# Patient Record
Sex: FEMALE | Race: BLACK OR AFRICAN AMERICAN | URBAN - METROPOLITAN AREA
[De-identification: names, ages, dates, MRNs, and addresses within clinical notes are randomized per-mention and may not be internally consistent; named-entity substitution may affect disease eponyms.]

---

## 2019-06-20 LAB — AMB DEXA, EXTERNAL: NORMAL

## 2020-06-23 LAB — PAP SMEAR, EXTERNAL: NORMAL

## 2020-07-08 LAB — MAMMOGRAPHY, EXTERNAL: NORMAL

## 2020-08-12 ENCOUNTER — TELEPHONE (OUTPATIENT)
Dept: OBGYN CLINIC | Age: 60
End: 2020-08-12

## 2020-08-12 DIAGNOSIS — B37.9 CANDIDIASIS: Primary | ICD-10-CM

## 2020-08-12 RX ORDER — FLUCONAZOLE 150 MG/1
TABLET ORAL
Qty: 2 TAB | Refills: 0 | Status: SHIPPED | OUTPATIENT
Start: 2020-08-12 | End: 2021-07-20 | Stop reason: ALTCHOICE

## 2020-08-12 NOTE — TELEPHONE ENCOUNTER
Returned patient's call and got her voicemail. Left a message that Diflucan was sent to her pharmacy per Dr Brenden Aaron.

## 2020-09-21 VITALS
BODY MASS INDEX: 24.68 KG/M2 | HEIGHT: 66 IN | SYSTOLIC BLOOD PRESSURE: 110 MMHG | DIASTOLIC BLOOD PRESSURE: 68 MMHG | WEIGHT: 153.6 LBS

## 2020-09-21 PROBLEM — M85.80 OSTEOPENIA: Status: ACTIVE | Noted: 2020-09-21

## 2020-09-21 PROBLEM — E66.9 OBESITY: Status: ACTIVE | Noted: 2020-09-21

## 2020-09-21 PROBLEM — Z78.0 MENOPAUSE: Status: ACTIVE | Noted: 2020-09-21

## 2021-06-03 ENCOUNTER — TELEPHONE (OUTPATIENT)
Dept: OBGYN CLINIC | Age: 61
End: 2021-06-03

## 2021-06-03 DIAGNOSIS — N95.1 SYMPTOMATIC MENOPAUSAL OR FEMALE CLIMACTERIC STATES: Primary | ICD-10-CM

## 2021-06-03 RX ORDER — ESTRADIOL 1 MG/1
1 TABLET ORAL DAILY
Qty: 90 TABLET | Refills: 0 | Status: SHIPPED | OUTPATIENT
Start: 2021-06-03 | End: 2021-07-20 | Stop reason: SDUPTHER

## 2021-06-03 NOTE — TELEPHONE ENCOUNTER
Returned the patient's call and advised her her request for a refill on Estradiol has been submitted to her pharmacy.

## 2021-07-14 VITALS — HEIGHT: 66 IN | BODY MASS INDEX: 24.79 KG/M2

## 2021-07-20 ENCOUNTER — OFFICE VISIT (OUTPATIENT)
Dept: OBGYN CLINIC | Age: 61
End: 2021-07-20
Payer: COMMERCIAL

## 2021-07-20 VITALS
BODY MASS INDEX: 25.09 KG/M2 | HEIGHT: 66 IN | WEIGHT: 156.13 LBS | SYSTOLIC BLOOD PRESSURE: 110 MMHG | DIASTOLIC BLOOD PRESSURE: 68 MMHG

## 2021-07-20 DIAGNOSIS — N95.1 SYMPTOMATIC MENOPAUSAL OR FEMALE CLIMACTERIC STATES: ICD-10-CM

## 2021-07-20 DIAGNOSIS — Z78.0 MENOPAUSE: ICD-10-CM

## 2021-07-20 DIAGNOSIS — Z12.72 ENCOUNTER FOR SCREENING FOR MALIGNANT NEOPLASM OF VAGINA: Primary | ICD-10-CM

## 2021-07-20 DIAGNOSIS — Z01.419 ROUTINE GYNECOLOGICAL EXAMINATION: ICD-10-CM

## 2021-07-20 PROCEDURE — 99396 PREV VISIT EST AGE 40-64: CPT | Performed by: OBSTETRICS & GYNECOLOGY

## 2021-07-20 RX ORDER — ESTRADIOL 1 MG/1
1 TABLET ORAL DAILY
Qty: 90 TABLET | Refills: 3 | Status: SHIPPED | OUTPATIENT
Start: 2021-07-20 | End: 2022-08-05 | Stop reason: SDUPTHER

## 2021-07-20 NOTE — PROGRESS NOTES
Chief Complaint   Patient presents with    Annual Exam     Mammo scheduled @ [facility], Colonoscopy-2011     Visit Vitals  /68 (BP 1 Location: Left upper arm, BP Patient Position: Sitting, BP Cuff Size: Small adult)   Ht 5' 6\" (1.676 m)   Wt 156 lb 2 oz (70.8 kg)   BMI 25.20 kg/m²

## 2021-07-20 NOTE — PROGRESS NOTES
Jt Severino is a , 62 y.o. female   No LMP recorded. Patient has had a hysterectomy. She presents for her annual    She is having no significant problems. Menstrual status:  Cycles are hysterectomy. Flow: absent. She does not have dysmenorrhea. Medical conditions:  Since her last annual GYN exam about one year ago, she has not the following changes in her health history: none. Mammogram History:    AIRAM Results (most recent):  No results found for this or any previous visit. DEXA Results (most recent):  No results found for this or any previous visit. History reviewed. No pertinent past medical history. Past Surgical History:   Procedure Laterality Date    HX GI      colonoscopy    HX HYSTERECTOMY      HX TONSIL AND ADENOIDECTOMY      HX UROLOGICAL      repair of stress incontinence by suprapubic sling       Prior to Admission medications    Medication Sig Start Date End Date Taking? Authorizing Provider   estradioL (ESTRACE) 1 mg tablet Take 1 Tablet by mouth daily. 21  Yes Morteza Powell MD       Allergies   Allergen Reactions    Clindamycin Diarrhea          Tobacco History:  reports that she has never smoked. She has never used smokeless tobacco.  Alcohol Abuse:  reports previous alcohol use. Drug Abuse:  reports no history of drug use. Family Medical/Cancer History:   Family History   Problem Relation Age of Onset    No Known Problems Mother     No Known Problems Father           Review of Systems   Constitutional: Negative for chills, fever, malaise/fatigue and weight loss. HENT: Negative for congestion, ear pain, sinus pain and tinnitus. Eyes: Negative for blurred vision and double vision. Respiratory: Negative for cough, shortness of breath and wheezing. Cardiovascular: Negative for chest pain and palpitations.    Gastrointestinal: Negative for abdominal pain, blood in stool, constipation, diarrhea, heartburn, nausea and vomiting. Genitourinary: Negative for dysuria, flank pain, frequency, hematuria and urgency. Musculoskeletal: Negative for joint pain and myalgias. Skin: Negative for itching and rash. Neurological: Negative for dizziness, weakness and headaches. Psychiatric/Behavioral: Negative for depression, memory loss and suicidal ideas. The patient is not nervous/anxious and does not have insomnia. Physical Exam  Constitutional:       Appearance: Normal appearance. HENT:      Head: Normocephalic and atraumatic. Cardiovascular:      Rate and Rhythm: Normal rate. Heart sounds: Normal heart sounds. Pulmonary:      Effort: Pulmonary effort is normal.      Breath sounds: Normal breath sounds. Chest:      Breasts:         Right: Normal.         Left: Normal.   Abdominal:      General: Abdomen is flat. Palpations: Abdomen is soft. Genitourinary:     General: Normal vulva. Vagina: Normal.      Uterus: Absent. Adnexa: Right adnexa normal and left adnexa normal.      Rectum: Normal.      Comments: PAP Obtained  Neurological:      Mental Status: She is alert. Psychiatric:         Mood and Affect: Mood normal.         Behavior: Behavior normal.         Thought Content: Thought content normal.          Visit Vitals  /68 (BP 1 Location: Left upper arm, BP Patient Position: Sitting, BP Cuff Size: Small adult)   Ht 5' 6\" (1.676 m)   Wt 156 lb 2 oz (70.8 kg)   BMI 25.20 kg/m²         Assessment:   Diagnoses and all orders for this visit:    1. Encounter for screening for malignant neoplasm of vagina  -     PAP IG, RFX APTIMA HPV ASCUS ([id number])    2. Routine gynecological examination  -     PAP IG, RFX APTIMA HPV ASCUS ([id number])    3. Menopause    4. Symptomatic menopausal or female climacteric states  -     estradioL (ESTRACE) 1 mg tablet; Take 1 Tablet by mouth daily.         Plan: Questions addressed  Counseled re: diet, exercise, healthy lifestyle  Return for Annual  Rec annual mammogram        Follow-up and Dispositions    · Return for 1 yr annual, 1 yr mammo.

## 2021-07-26 LAB
CYTOLOGIST CVX/VAG CYTO: ABNORMAL
CYTOLOGY CVX/VAG DOC CYTO: ABNORMAL
CYTOLOGY CVX/VAG DOC THIN PREP: ABNORMAL
DX ICD CODE: ABNORMAL
DX ICD CODE: ABNORMAL
LABCORP, 190119: ABNORMAL
Lab: ABNORMAL
OTHER STN SPEC: ABNORMAL
PATHOLOGIST CVX/VAG CYTO: ABNORMAL
STAT OF ADQ CVX/VAG CYTO-IMP: ABNORMAL

## 2021-08-03 RX ORDER — ESTRADIOL 10 UG/1
10 INSERT VAGINAL
Qty: 8 TABLET | Refills: 5 | Status: SHIPPED | OUTPATIENT
Start: 2021-08-05 | End: 2022-01-18

## 2021-12-29 ENCOUNTER — TELEPHONE (OUTPATIENT)
Dept: OBGYN CLINIC | Age: 61
End: 2021-12-29

## 2021-12-29 NOTE — TELEPHONE ENCOUNTER
Left a message on the patient's voicemail to call me back to get her follow up appt scheduled with Dr Ramon.

## 2022-01-18 RX ORDER — ESTRADIOL 10 UG/1
TABLET VAGINAL
Qty: 24 TABLET | Refills: 1 | Status: SHIPPED | OUTPATIENT
Start: 2022-01-18 | End: 2022-08-05 | Stop reason: SDUPTHER

## 2022-01-24 ENCOUNTER — OFFICE VISIT (OUTPATIENT)
Dept: OBGYN CLINIC | Age: 62
End: 2022-01-24
Payer: COMMERCIAL

## 2022-01-24 VITALS — BODY MASS INDEX: 25.09 KG/M2 | WEIGHT: 156.13 LBS | HEIGHT: 66 IN

## 2022-01-24 DIAGNOSIS — R87.622 LOW GRADE SQUAMOUS INTRAEPITH LESION ON CYTOLOGIC SMEAR VAGINA (LGSIL): Primary | ICD-10-CM

## 2022-01-24 PROCEDURE — 99213 OFFICE O/P EST LOW 20 MIN: CPT | Performed by: OBSTETRICS & GYNECOLOGY

## 2022-01-24 NOTE — PROGRESS NOTES
Anurag Hong is a , 62 y.o. female   No LMP recorded. Patient has had a hysterectomy. She presents for her problem    She is having no significant problems presents for f/u pap of cuff, on local ERT      Menstrual status:  Cycles are hysterectomy. Flow: absent. She does not have dysmenorrhea. Medical conditions:  Since her last annual GYN exam about one year ago, she has not the following changes in her health history: none. Mammogram History:    AIRAM Results (most recent):  No results found for this or any previous visit. DEXA Results (most recent):  No results found for this or any previous visit. Past Medical History:   Diagnosis Date    Abnormal Papanicolaou smear of cervix 2021    lgsil     Past Surgical History:   Procedure Laterality Date    HX GI      colonoscopy    HX HYSTERECTOMY      HX TONSIL AND ADENOIDECTOMY      HX UROLOGICAL      repair of stress incontinence by suprapubic sling       Prior to Admission medications    Medication Sig Start Date End Date Taking? Authorizing Provider   Yuvafem 10 mcg tab vaginal tablet INSERT 1 TABLET INTO VAGINA EVERY MONDAY AND THURSDAY. 22  Yes Veronica Pascual MD   estradioL (ESTRACE) 1 mg tablet Take 1 Tablet by mouth daily. 21  Yes Veronica Pascual MD       Allergies   Allergen Reactions    Clindamycin Diarrhea          Tobacco History:  reports that she has never smoked. She has never used smokeless tobacco.  Alcohol Abuse:  reports previous alcohol use. Drug Abuse:  reports no history of drug use. Family Medical/Cancer History:   Family History   Problem Relation Age of Onset    No Known Problems Mother     No Known Problems Father           Review of Systems   Constitutional: Negative for chills, fever, malaise/fatigue and weight loss. HENT: Negative for congestion, ear pain, sinus pain and tinnitus. Eyes: Negative for blurred vision and double vision.    Respiratory: Negative for cough, shortness of breath and wheezing. Cardiovascular: Negative for chest pain and palpitations. Gastrointestinal: Negative for abdominal pain, blood in stool, constipation, diarrhea, heartburn, nausea and vomiting. Genitourinary: Negative for dysuria, flank pain, frequency, hematuria and urgency. Musculoskeletal: Negative for joint pain and myalgias. Skin: Negative for itching and rash. Neurological: Negative for dizziness, weakness and headaches. Psychiatric/Behavioral: Negative for depression, memory loss and suicidal ideas. The patient is not nervous/anxious and does not have insomnia. Physical Exam  Constitutional:       Appearance: Normal appearance. HENT:      Head: Normocephalic and atraumatic. Abdominal:      General: Abdomen is flat. Palpations: Abdomen is soft. Genitourinary:     General: Normal vulva. Vagina: Normal.      Uterus: Absent. Adnexa: Right adnexa normal and left adnexa normal.      Rectum: Normal.      Comments: PAP Obtained  Neurological:      Mental Status: She is alert. Psychiatric:         Mood and Affect: Mood normal.         Behavior: Behavior normal.         Thought Content: Thought content normal.          Visit Vitals  Ht 5' 6\" (1.676 m)   Wt 156 lb 2 oz (70.8 kg)   BMI 25.20 kg/m²         Assessment: Diagnoses and all orders for this visit:    1.  Low grade squamous intraepith lesion on cytologic smear vagina (lgsil)  -     PAP IG, RFX APTIMA HPV ASCUS ([id number])        Plan: Questions addressed  Counseled re: diet, exercise, healthy lifestyle  Return for Annual  Rec annual mammogram

## 2022-01-24 NOTE — PROGRESS NOTES
Chief Complaint   Patient presents with    Follow-up     6 mth pap     Visit Vitals  Ht 5' 6\" (1.676 m)   Wt 156 lb 2 oz (70.8 kg)   BMI 25.20 kg/m²

## 2022-01-28 LAB
CYTOLOGIST CVX/VAG CYTO: ABNORMAL
CYTOLOGY CVX/VAG DOC CYTO: ABNORMAL
CYTOLOGY CVX/VAG DOC THIN PREP: ABNORMAL
DX ICD CODE: ABNORMAL
DX ICD CODE: ABNORMAL
HPV I/H RISK 4 DNA CVX QL PROBE+SIG AMP: NEGATIVE
LABCORP, 190119: ABNORMAL
Lab: ABNORMAL
OTHER STN SPEC: ABNORMAL
PATHOLOGIST CVX/VAG CYTO: ABNORMAL
STAT OF ADQ CVX/VAG CYTO-IMP: ABNORMAL

## 2022-01-28 NOTE — PROGRESS NOTES
Pap f/up:  ASCUS, HPV HR negative  Repap in 6 mths at annual  [facility]

## 2022-03-18 PROBLEM — Z78.0 MENOPAUSE: Status: ACTIVE | Noted: 2020-09-21

## 2022-03-19 PROBLEM — M85.80 OSTEOPENIA: Status: ACTIVE | Noted: 2020-09-21

## 2022-03-20 PROBLEM — E66.9 OBESITY: Status: ACTIVE | Noted: 2020-09-21

## 2022-06-17 ENCOUNTER — TELEPHONE (OUTPATIENT)
Dept: OBGYN CLINIC | Age: 62
End: 2022-06-17

## 2022-06-17 NOTE — TELEPHONE ENCOUNTER
Left a message on the patient's voicemail to return my call regarding scheduling her 6 month follow up appt with Dr Tonio Cooney.

## 2022-08-05 ENCOUNTER — OFFICE VISIT (OUTPATIENT)
Dept: OBGYN CLINIC | Age: 62
End: 2022-08-05
Payer: COMMERCIAL

## 2022-08-05 VITALS
DIASTOLIC BLOOD PRESSURE: 68 MMHG | BODY MASS INDEX: 25.13 KG/M2 | SYSTOLIC BLOOD PRESSURE: 110 MMHG | HEIGHT: 66 IN | WEIGHT: 156.38 LBS

## 2022-08-05 DIAGNOSIS — N95.1 SYMPTOMATIC MENOPAUSAL OR FEMALE CLIMACTERIC STATES: ICD-10-CM

## 2022-08-05 DIAGNOSIS — Z01.419 ROUTINE GYNECOLOGICAL EXAMINATION: ICD-10-CM

## 2022-08-05 DIAGNOSIS — Z87.42 HISTORY OF ABNORMAL CERVICAL PAP SMEAR: ICD-10-CM

## 2022-08-05 DIAGNOSIS — Z12.72 ENCOUNTER FOR SCREENING FOR MALIGNANT NEOPLASM OF VAGINA: Primary | ICD-10-CM

## 2022-08-05 PROCEDURE — 99396 PREV VISIT EST AGE 40-64: CPT | Performed by: OBSTETRICS & GYNECOLOGY

## 2022-08-05 RX ORDER — ESTRADIOL 10 UG/1
10 INSERT VAGINAL
Qty: 24 TABLET | Refills: 3 | Status: SHIPPED | OUTPATIENT
Start: 2022-08-08

## 2022-08-05 RX ORDER — ESTRADIOL 1 MG/1
1 TABLET ORAL DAILY
Qty: 90 TABLET | Refills: 3 | Status: SHIPPED | OUTPATIENT
Start: 2022-08-05

## 2022-08-05 NOTE — PROGRESS NOTES
Chief Complaint   Patient presents with    Annual Exam     Mammo-2021     Visit Vitals  /68 (BP 1 Location: Left upper arm, BP Patient Position: Sitting, BP Cuff Size: Small adult)   Ht 5' 6\" (1.676 m)   Wt 156 lb 6 oz (70.9 kg)   BMI 25.24 kg/m²

## 2022-08-05 NOTE — PROGRESS NOTES
Orlando Griffiths is a , 62 y.o. female   No LMP recorded. Patient has had a hysterectomy. She presents for her annual    She is having no significant problems. Menstrual status:  Cycles are hysterectomy. Flow: absent. She does not have dysmenorrhea. Medical conditions:  Since her last annual GYN exam about one year ago, she has not the following changes in her health history: none. Mammogram History:    AIRAM Results (most recent):  No results found for this or any previous visit. DEXA Results (most recent):  No results found for this or any previous visit. Past Medical History:   Diagnosis Date    Abnormal Papanicolaou smear of cervix 2021    lgsil     Past Surgical History:   Procedure Laterality Date    HX GI      colonoscopy    HX HYSTERECTOMY      HX TONSIL AND ADENOIDECTOMY      HX UROLOGICAL      repair of stress incontinence by suprapubic sling       Prior to Admission medications    Medication Sig Start Date End Date Taking? Authorizing Provider   estradioL (ESTRACE) 1 mg tablet Take 1 Tablet by mouth in the morning. 22  Yes Sobia Diaz MD   estradioL [place]) 10 mcg tab vaginal tablet Insert 1 Tablet into vagina every Monday and Thursday. 22  Yes Sobia Diaz MD       Allergies   Allergen Reactions    Clindamycin Diarrhea          Tobacco History:  reports that she has never smoked. She has never used smokeless tobacco.  Alcohol use:  reports that she does not currently use alcohol. Drug use:  reports no history of drug use. Family Medical/Cancer History:   Family History   Problem Relation Age of Onset    No Known Problems Mother     No Known Problems Father           Review of Systems   Constitutional:  Negative for chills, fever, malaise/fatigue and weight loss. HENT:  Negative for congestion, ear pain, sinus pain and tinnitus. Eyes:  Negative for blurred vision and double vision.    Respiratory:  Negative for cough, shortness of breath and wheezing. Cardiovascular:  Negative for chest pain and palpitations. Gastrointestinal:  Negative for abdominal pain, blood in stool, constipation, diarrhea, heartburn, nausea and vomiting. Genitourinary:  Negative for dysuria, flank pain, frequency, hematuria and urgency. Musculoskeletal:  Negative for joint pain and myalgias. Skin:  Negative for itching and rash. Neurological:  Negative for dizziness, weakness and headaches. Psychiatric/Behavioral:  Negative for depression, memory loss and suicidal ideas. The patient is not nervous/anxious and does not have insomnia. Physical Exam  Constitutional:       Appearance: Normal appearance. HENT:      Head: Normocephalic and atraumatic. Cardiovascular:      Rate and Rhythm: Normal rate. Heart sounds: Normal heart sounds. Pulmonary:      Effort: Pulmonary effort is normal.      Breath sounds: Normal breath sounds. Chest:   Breasts:     Right: Normal.      Left: Normal.   Abdominal:      General: Abdomen is flat. Palpations: Abdomen is soft. Genitourinary:     General: Normal vulva. Vagina: Normal.      Uterus: Absent. Adnexa: Right adnexa normal and left adnexa normal.      Rectum: Normal.      Comments: PAP Obtained  Neurological:      Mental Status: She is alert. Psychiatric:         Mood and Affect: Mood normal.         Behavior: Behavior normal.         Thought Content: Thought content normal.        Visit Vitals  /68 (BP 1 Location: Left upper arm, BP Patient Position: Sitting, BP Cuff Size: Small adult)   Ht 5' 6\" (1.676 m)   Wt 156 lb 6 oz (70.9 kg)   BMI 25.24 kg/m²         Assessment: Diagnoses and all orders for this visit:    1. Encounter for screening for malignant neoplasm of vagina  -     PAP IG, RFX APTIMA HPV ASCUS ([id number])    2. Routine gynecological examination  -     PAP IG, RFX APTIMA HPV ASCUS ([id number])    3. History of abnormal cervical Pap smear    4.  Symptomatic menopausal or female climacteric states  -     estradioL (ESTRACE) 1 mg tablet; Take 1 Tablet by mouth in the morning. Other orders  -     estradioL (Yuvafem) 10 mcg tab vaginal tablet; Insert 1 Tablet into vagina every Monday and Thursday. Plan: Questions addressed  Counseled re: diet, exercise, healthy lifestyle  Return for Annual  Rec annual mammogram        Follow-up and Dispositions    Return for Mammogram, 1 yr annual, 1 yr mammo.

## 2022-08-11 LAB
CYTOLOGIST CVX/VAG CYTO: NORMAL
CYTOLOGY CVX/VAG DOC CYTO: NORMAL
CYTOLOGY CVX/VAG DOC THIN PREP: NORMAL
DX ICD CODE: NORMAL
LABCORP, 190119: NORMAL
Lab: NORMAL
OTHER STN SPEC: NORMAL
PATHOLOGIST CVX/VAG CYTO: NORMAL
STAT OF ADQ CVX/VAG CYTO-IMP: NORMAL

## 2023-05-17 RX ORDER — ESTRADIOL 10 UG/1
10 INSERT VAGINAL
COMMUNITY
Start: 2022-08-08

## 2023-05-17 RX ORDER — ESTRADIOL 1 MG/1
1 TABLET ORAL DAILY
COMMUNITY
Start: 2022-08-05

## 2023-08-08 ENCOUNTER — OFFICE VISIT (OUTPATIENT)
Age: 63
End: 2023-08-08
Payer: COMMERCIAL

## 2023-08-08 VITALS
DIASTOLIC BLOOD PRESSURE: 74 MMHG | WEIGHT: 156.13 LBS | SYSTOLIC BLOOD PRESSURE: 124 MMHG | HEIGHT: 66 IN | BODY MASS INDEX: 25.09 KG/M2

## 2023-08-08 DIAGNOSIS — Z12.72 SCREENING FOR VAGINAL CANCER: ICD-10-CM

## 2023-08-08 DIAGNOSIS — N95.9 MENOPAUSAL PROBLEM: Primary | ICD-10-CM

## 2023-08-08 DIAGNOSIS — Z01.419 GYNECOLOGIC EXAM NORMAL: ICD-10-CM

## 2023-08-08 DIAGNOSIS — Z12.31 SCREENING MAMMOGRAM FOR BREAST CANCER: ICD-10-CM

## 2023-08-08 PROCEDURE — 99396 PREV VISIT EST AGE 40-64: CPT | Performed by: OBSTETRICS & GYNECOLOGY

## 2023-08-08 RX ORDER — ESTRADIOL 1 MG/1
1 TABLET ORAL DAILY
Qty: 90 TABLET | Refills: 3 | Status: SHIPPED | OUTPATIENT
Start: 2023-08-08

## 2023-08-08 RX ORDER — ESTRADIOL 10 UG/1
INSERT VAGINAL
Qty: 24 TABLET | Refills: 3 | Status: SHIPPED | OUTPATIENT
Start: 2023-08-08

## 2023-08-08 RX ORDER — ESTRADIOL 0.5 MG/1
0.5 TABLET ORAL DAILY
Qty: 90 TABLET | Refills: 3 | Status: SHIPPED | OUTPATIENT
Start: 2023-08-08

## 2023-08-08 ASSESSMENT — ENCOUNTER SYMPTOMS
RESPIRATORY NEGATIVE: 1
GASTROINTESTINAL NEGATIVE: 1

## 2023-08-17 LAB
., LABCORP: ABNORMAL
CYTOLOGIST CVX/VAG CYTO: ABNORMAL
CYTOLOGY CVX/VAG DOC CYTO: ABNORMAL
CYTOLOGY CVX/VAG DOC THIN PREP: ABNORMAL
DX ICD CODE: ABNORMAL
DX ICD CODE: ABNORMAL
HPV I/H RISK 4 DNA CVX QL PROBE+SIG AMP: POSITIVE
Lab: ABNORMAL
OTHER STN SPEC: ABNORMAL
PATHOLOGIST CVX/VAG CYTO: ABNORMAL
STAT OF ADQ CVX/VAG CYTO-IMP: ABNORMAL

## 2023-10-05 ENCOUNTER — TELEPHONE (OUTPATIENT)
Age: 63
End: 2023-10-05

## 2023-10-05 DIAGNOSIS — N95.9 MENOPAUSAL PROBLEM: ICD-10-CM

## 2023-10-13 DIAGNOSIS — Z12.31 SCREENING MAMMOGRAM FOR BREAST CANCER: ICD-10-CM

## 2024-05-16 ENCOUNTER — OFFICE VISIT (OUTPATIENT)
Age: 64
End: 2024-05-16
Payer: COMMERCIAL

## 2024-05-16 VITALS — WEIGHT: 162 LBS | BODY MASS INDEX: 26.15 KG/M2 | SYSTOLIC BLOOD PRESSURE: 151 MMHG | DIASTOLIC BLOOD PRESSURE: 72 MMHG

## 2024-05-16 DIAGNOSIS — R87.629 ABNORMAL PAP SMEAR OF VAGINA: Primary | ICD-10-CM

## 2024-05-16 PROCEDURE — 99213 OFFICE O/P EST LOW 20 MIN: CPT | Performed by: OBSTETRICS & GYNECOLOGY

## 2024-05-16 ASSESSMENT — ENCOUNTER SYMPTOMS
GASTROINTESTINAL NEGATIVE: 1
RESPIRATORY NEGATIVE: 1

## 2024-05-16 NOTE — PROGRESS NOTES
Musculoskeletal: Negative.    Neurological: Negative.    Psychiatric/Behavioral: Negative.           BP (!) 151/72 (Site: Left Upper Arm, Position: Sitting, Cuff Size: Small Adult)   Wt 73.5 kg (162 lb)   BMI 26.15 kg/m²     Physical Exam  Constitutional:       Appearance: Normal appearance.   Genitourinary:     General: Normal vulva.      Vagina: Normal.      Uterus: Absent.       Comments: Pap collected  Neurological:      Mental Status: She is alert.   Psychiatric:         Mood and Affect: Mood normal.         Behavior: Behavior normal.         Thought Content: Thought content normal.         Judgment: Judgment normal.              Assessment: Sarah was seen today for follow-up.    Diagnoses and all orders for this visit:    Abnormal Pap smear of vagina        Plan: Questions addressed  Counseled re: diet, exercise, healthy lifestyle  Return for Annual  Rec annual mammogram  Rec: DEXA  Rec: US  Rec: Colonoscopy

## 2024-05-22 LAB
., LABCORP: NORMAL
CYTOLOGIST CVX/VAG CYTO: NORMAL
CYTOLOGY CVX/VAG DOC CYTO: NORMAL
CYTOLOGY CVX/VAG DOC THIN PREP: NORMAL
DX ICD CODE: NORMAL
Lab: NORMAL
OTHER STN SPEC: NORMAL
STAT OF ADQ CVX/VAG CYTO-IMP: NORMAL

## 2024-09-02 DIAGNOSIS — N95.9 MENOPAUSAL PROBLEM: ICD-10-CM

## 2024-09-03 RX ORDER — ESTRADIOL 1 MG/1
1 TABLET ORAL DAILY
Qty: 90 TABLET | Refills: 3 | Status: SHIPPED | OUTPATIENT
Start: 2024-09-03